# Patient Record
Sex: MALE | Race: BLACK OR AFRICAN AMERICAN | NOT HISPANIC OR LATINO | Employment: OTHER | URBAN - METROPOLITAN AREA
[De-identification: names, ages, dates, MRNs, and addresses within clinical notes are randomized per-mention and may not be internally consistent; named-entity substitution may affect disease eponyms.]

---

## 2021-07-08 ENCOUNTER — HOSPITAL ENCOUNTER (EMERGENCY)
Facility: HOSPITAL | Age: 61
Discharge: HOME | End: 2021-07-08
Attending: EMERGENCY MEDICINE | Admitting: EMERGENCY MEDICINE

## 2021-07-08 ENCOUNTER — APPOINTMENT (OUTPATIENT)
Dept: CT IMAGING | Facility: HOSPITAL | Age: 61
End: 2021-07-08

## 2021-07-08 ENCOUNTER — APPOINTMENT (OUTPATIENT)
Dept: GENERAL RADIOLOGY | Facility: HOSPITAL | Age: 61
End: 2021-07-08

## 2021-07-08 VITALS
SYSTOLIC BLOOD PRESSURE: 127 MMHG | RESPIRATION RATE: 16 BRPM | OXYGEN SATURATION: 99 % | TEMPERATURE: 98.6 F | HEART RATE: 72 BPM | DIASTOLIC BLOOD PRESSURE: 85 MMHG

## 2021-07-08 DIAGNOSIS — R55 SYNCOPE AND COLLAPSE: Primary | ICD-10-CM

## 2021-07-08 DIAGNOSIS — S50.312A ABRASION OF LEFT ELBOW, INITIAL ENCOUNTER: ICD-10-CM

## 2021-07-08 DIAGNOSIS — S01.81XA FACIAL LACERATION, INITIAL ENCOUNTER: ICD-10-CM

## 2021-07-08 LAB
ALBUMIN SERPL-MCNC: 4.2 G/DL (ref 3.5–5.2)
ALBUMIN/GLOB SERPL: 1.6 G/DL
ALP SERPL-CCNC: 51 U/L (ref 39–117)
ALT SERPL W P-5'-P-CCNC: 17 U/L (ref 1–41)
AMPHET+METHAMPHET UR QL: NEGATIVE
ANION GAP SERPL CALCULATED.3IONS-SCNC: 10 MMOL/L (ref 5–15)
AST SERPL-CCNC: 19 U/L (ref 1–40)
BACTERIA UR QL AUTO: ABNORMAL /HPF
BARBITURATES UR QL SCN: NEGATIVE
BASOPHILS # BLD AUTO: 0.05 10*3/MM3 (ref 0–0.2)
BASOPHILS NFR BLD AUTO: 0.4 % (ref 0–1.5)
BENZODIAZ UR QL SCN: NEGATIVE
BILIRUB SERPL-MCNC: 0.5 MG/DL (ref 0–1.2)
BILIRUB UR QL STRIP: NEGATIVE
BUN SERPL-MCNC: 14 MG/DL (ref 8–23)
BUN/CREAT SERPL: 16.9 (ref 7–25)
CALCIUM SPEC-SCNC: 9.5 MG/DL (ref 8.6–10.5)
CANNABINOIDS SERPL QL: NEGATIVE
CHLORIDE SERPL-SCNC: 106 MMOL/L (ref 98–107)
CLARITY UR: CLEAR
CO2 SERPL-SCNC: 25 MMOL/L (ref 22–29)
COCAINE UR QL: NEGATIVE
COLOR UR: YELLOW
CREAT SERPL-MCNC: 0.83 MG/DL (ref 0.76–1.27)
DEPRECATED RDW RBC AUTO: 43.2 FL (ref 37–54)
EOSINOPHIL # BLD AUTO: 0.02 10*3/MM3 (ref 0–0.4)
EOSINOPHIL NFR BLD AUTO: 0.2 % (ref 0.3–6.2)
ERYTHROCYTE [DISTWIDTH] IN BLOOD BY AUTOMATED COUNT: 12.6 % (ref 12.3–15.4)
ETHANOL BLD-MCNC: <10 MG/DL (ref 0–10)
ETHANOL UR QL: <0.01 %
GFR SERPL CREATININE-BSD FRML MDRD: 113 ML/MIN/1.73
GLOBULIN UR ELPH-MCNC: 2.7 GM/DL
GLUCOSE SERPL-MCNC: 114 MG/DL (ref 65–99)
GLUCOSE UR STRIP-MCNC: NEGATIVE MG/DL
HCT VFR BLD AUTO: 43 % (ref 37.5–51)
HGB BLD-MCNC: 14.7 G/DL (ref 13–17.7)
HGB UR QL STRIP.AUTO: NEGATIVE
HYALINE CASTS UR QL AUTO: ABNORMAL /LPF
IMM GRANULOCYTES # BLD AUTO: 0.1 10*3/MM3 (ref 0–0.05)
IMM GRANULOCYTES NFR BLD AUTO: 0.9 % (ref 0–0.5)
KETONES UR QL STRIP: NEGATIVE
LEUKOCYTE ESTERASE UR QL STRIP.AUTO: ABNORMAL
LYMPHOCYTES # BLD AUTO: 1.5 10*3/MM3 (ref 0.7–3.1)
LYMPHOCYTES NFR BLD AUTO: 12.9 % (ref 19.6–45.3)
MAGNESIUM SERPL-MCNC: 2.1 MG/DL (ref 1.6–2.4)
MCH RBC QN AUTO: 32.1 PG (ref 26.6–33)
MCHC RBC AUTO-ENTMCNC: 34.2 G/DL (ref 31.5–35.7)
MCV RBC AUTO: 93.9 FL (ref 79–97)
METHADONE UR QL SCN: NEGATIVE
MONOCYTES # BLD AUTO: 0.67 10*3/MM3 (ref 0.1–0.9)
MONOCYTES NFR BLD AUTO: 5.8 % (ref 5–12)
NEUTROPHILS NFR BLD AUTO: 79.8 % (ref 42.7–76)
NEUTROPHILS NFR BLD AUTO: 9.28 10*3/MM3 (ref 1.7–7)
NITRITE UR QL STRIP: NEGATIVE
NRBC BLD AUTO-RTO: 0 /100 WBC (ref 0–0.2)
OPIATES UR QL: NEGATIVE
OXYCODONE UR QL SCN: NEGATIVE
PH UR STRIP.AUTO: 6 [PH] (ref 5–8)
PLATELET # BLD AUTO: 228 10*3/MM3 (ref 140–450)
PMV BLD AUTO: 10 FL (ref 6–12)
POTASSIUM SERPL-SCNC: 4.7 MMOL/L (ref 3.5–5.2)
PROT SERPL-MCNC: 6.9 G/DL (ref 6–8.5)
PROT UR QL STRIP: ABNORMAL
QT INTERVAL: 355 MS
RBC # BLD AUTO: 4.58 10*6/MM3 (ref 4.14–5.8)
RBC # UR: ABNORMAL /HPF
REF LAB TEST METHOD: ABNORMAL
SODIUM SERPL-SCNC: 141 MMOL/L (ref 136–145)
SP GR UR STRIP: 1.02 (ref 1–1.03)
SQUAMOUS #/AREA URNS HPF: ABNORMAL /HPF
TRANS CELLS #/AREA URNS HPF: ABNORMAL /HPF
TROPONIN T SERPL-MCNC: <0.01 NG/ML (ref 0–0.03)
UROBILINOGEN UR QL STRIP: ABNORMAL
WBC # BLD AUTO: 11.62 10*3/MM3 (ref 3.4–10.8)
WBC UR QL AUTO: ABNORMAL /HPF

## 2021-07-08 PROCEDURE — 72125 CT NECK SPINE W/O DYE: CPT

## 2021-07-08 PROCEDURE — 93005 ELECTROCARDIOGRAM TRACING: CPT

## 2021-07-08 PROCEDURE — 99284 EMERGENCY DEPT VISIT MOD MDM: CPT

## 2021-07-08 PROCEDURE — 80307 DRUG TEST PRSMV CHEM ANLYZR: CPT | Performed by: EMERGENCY MEDICINE

## 2021-07-08 PROCEDURE — 93010 ELECTROCARDIOGRAM REPORT: CPT | Performed by: INTERNAL MEDICINE

## 2021-07-08 PROCEDURE — 84484 ASSAY OF TROPONIN QUANT: CPT | Performed by: EMERGENCY MEDICINE

## 2021-07-08 PROCEDURE — 73070 X-RAY EXAM OF ELBOW: CPT

## 2021-07-08 PROCEDURE — 70450 CT HEAD/BRAIN W/O DYE: CPT

## 2021-07-08 PROCEDURE — 82077 ASSAY SPEC XCP UR&BREATH IA: CPT | Performed by: EMERGENCY MEDICINE

## 2021-07-08 PROCEDURE — 81001 URINALYSIS AUTO W/SCOPE: CPT | Performed by: EMERGENCY MEDICINE

## 2021-07-08 PROCEDURE — 71046 X-RAY EXAM CHEST 2 VIEWS: CPT

## 2021-07-08 PROCEDURE — 80053 COMPREHEN METABOLIC PANEL: CPT | Performed by: EMERGENCY MEDICINE

## 2021-07-08 PROCEDURE — 83735 ASSAY OF MAGNESIUM: CPT | Performed by: EMERGENCY MEDICINE

## 2021-07-08 PROCEDURE — 85025 COMPLETE CBC W/AUTO DIFF WBC: CPT | Performed by: EMERGENCY MEDICINE

## 2021-07-08 RX ORDER — LIDOCAINE HYDROCHLORIDE AND EPINEPHRINE 10; 10 MG/ML; UG/ML
10 INJECTION, SOLUTION INFILTRATION; PERINEURAL ONCE
Status: COMPLETED | OUTPATIENT
Start: 2021-07-08 | End: 2021-07-08

## 2021-07-08 RX ORDER — SODIUM CHLORIDE 0.9 % (FLUSH) 0.9 %
10 SYRINGE (ML) INJECTION AS NEEDED
Status: DISCONTINUED | OUTPATIENT
Start: 2021-07-08 | End: 2021-07-08 | Stop reason: HOSPADM

## 2021-07-08 RX ADMIN — LIDOCAINE HYDROCHLORIDE,EPINEPHRINE BITARTRATE 10 ML: 10; .01 INJECTION, SOLUTION INFILTRATION; PERINEURAL at 18:03

## 2021-07-08 NOTE — ED PROVIDER NOTES
EMERGENCY DEPARTMENT ENCOUNTER    Room Number:  35/35  Date of encounter:  7/8/2021  PCP: Provider, No Known  Historian: Patient and EMS      HPI:  Chief Complaint: Passed out    A complete HPI/ROS/PMH/PSH/SH/FH are unobtainable due to: Uncooperative    Context: Oziel Acosta is a 61 y.o. male who presents to the ED c/o passing out this afternoon.  The patient states he was just discharged from the [facility] earlier today and was headed to a shelter but the shelter was not open so he decided to use a large amount of spice and then passed out hitting his head.  EMS was called and they brought [facility].  The patient complains of pain all over but no specific complaints.      PAST MEDICAL HISTORY  Active Ambulatory Problems     Diagnosis Date Noted   • No Active Ambulatory Problems     Resolved Ambulatory Problems     Diagnosis Date Noted   • No Resolved Ambulatory Problems     No Additional Past Medical History         PAST SURGICAL HISTORY  No past surgical history on file.      FAMILY HISTORY  No family history on file.      SOCIAL HISTORY  Social History     Socioeconomic History   • Marital status: Single     Spouse name: Not on file   • Number of children: Not on file   • Years of education: Not on file   • Highest education level: Not on file         ALLERGIES  Keflex [cephalexin]        REVIEW OF SYSTEMS  Review of Systems     The patient does complain of headache but denies neck pain, sore throat, chest pain, fevers, chills, cough, known Covid exposure, abdominal pain, vomiting, diarrhea or focal neuro deficit    All systems reviewed and negative except for those discussed in HPI.     PHYSICAL EXAM    I have reviewed the triage vital signs and nursing notes.    ED Triage Vitals [07/08/21 1623]   Temp Heart Rate Resp BP SpO2   98.6 °F (37 °C) 100 16 100/70 100 %      Temp src Heart Rate Source Patient Position BP Location FiO2 (%)   Temporal Monitor -- -- --       GENERAL:  61-year-old disheveled well developed, well nourished in no acute distress  HENT: Laceration lateral to right eye, no C-spine tenderness, neck supple, trachea midline  EYES: no scleral icterus, PERRL, normal conjunctiva  CV: regular rhythm, regular rate, no murmur  RESPIRATORY: unlabored effort, CTAB  ABDOMEN: soft, non-tender, non-distended, bowel sounds present  MUSCULOSKELETAL: no gross deformity, no pedal edema, no calf tenderness: The patient has a skin tear to his left elbow  NEURO: alert,  sensory and motor function of extremities intact, speech clear, mental status normal  SKIN: warm, dry, no rash  PSYCH: Flat affect      PPE  Pt does not present with symptoms for COVID19; however, I was wearing a mask and goggles throughout all patient interaction.    Vital signs and nursing notes reviewed.      LAB RESULTS  Recent Results (from the past 24 hour(s))   ECG 12 Lead    Collection Time: 07/08/21  4:37 PM   Result Value Ref Range    QT Interval 355 ms   Comprehensive Metabolic Panel    Collection Time: 07/08/21  5:59 PM    Specimen: Blood   Result Value Ref Range    Glucose 114 (H) 65 - 99 mg/dL    BUN 14 8 - 23 mg/dL    Creatinine 0.83 0.76 - 1.27 mg/dL    Sodium 141 136 - 145 mmol/L    Potassium 4.7 3.5 - 5.2 mmol/L    Chloride 106 98 - 107 mmol/L    CO2 25.0 22.0 - 29.0 mmol/L    Calcium 9.5 8.6 - 10.5 mg/dL    Total Protein 6.9 6.0 - 8.5 g/dL    Albumin 4.20 3.50 - 5.20 g/dL    ALT (SGPT) 17 1 - 41 U/L    AST (SGOT) 19 1 - 40 U/L    Alkaline Phosphatase 51 39 - 117 U/L    Total Bilirubin 0.5 0.0 - 1.2 mg/dL    eGFR  African Amer 113 >60 mL/min/1.73    Globulin 2.7 gm/dL    A/G Ratio 1.6 g/dL    BUN/Creatinine Ratio 16.9 7.0 - 25.0    Anion Gap 10.0 5.0 - 15.0 mmol/L   Troponin    Collection Time: 07/08/21  5:59 PM    Specimen: Blood   Result Value Ref Range    Troponin T <0.010 0.000 - 0.030 ng/mL   Ethanol    Collection Time: 07/08/21  5:59 PM    Specimen: Blood   Result Value Ref Range    Ethanol <10 0 -  10 mg/dL    Ethanol % <0.010 %   Magnesium    Collection Time: 07/08/21  5:59 PM    Specimen: Blood   Result Value Ref Range    Magnesium 2.1 1.6 - 2.4 mg/dL   CBC Auto Differential    Collection Time: 07/08/21  5:59 PM    Specimen: Blood   Result Value Ref Range    WBC 11.62 (H) 3.40 - 10.80 10*3/mm3    RBC 4.58 4.14 - 5.80 10*6/mm3    Hemoglobin 14.7 13.0 - 17.7 g/dL    Hematocrit 43.0 37.5 - 51.0 %    MCV 93.9 79.0 - 97.0 fL    MCH 32.1 26.6 - 33.0 pg    MCHC 34.2 31.5 - 35.7 g/dL    RDW 12.6 12.3 - 15.4 %    RDW-SD 43.2 37.0 - 54.0 fl    MPV 10.0 6.0 - 12.0 fL    Platelets 228 140 - 450 10*3/mm3    Neutrophil % 79.8 (H) 42.7 - 76.0 %    Lymphocyte % 12.9 (L) 19.6 - 45.3 %    Monocyte % 5.8 5.0 - 12.0 %    Eosinophil % 0.2 (L) 0.3 - 6.2 %    Basophil % 0.4 0.0 - 1.5 %    Immature Grans % 0.9 (H) 0.0 - 0.5 %    Neutrophils, Absolute 9.28 (H) 1.70 - 7.00 10*3/mm3    Lymphocytes, Absolute 1.50 0.70 - 3.10 10*3/mm3    Monocytes, Absolute 0.67 0.10 - 0.90 10*3/mm3    Eosinophils, Absolute 0.02 0.00 - 0.40 10*3/mm3    Basophils, Absolute 0.05 0.00 - 0.20 10*3/mm3    Immature Grans, Absolute 0.10 (H) 0.00 - 0.05 10*3/mm3    nRBC 0.0 0.0 - 0.2 /100 WBC   Urinalysis With Microscopic If Indicated (No Culture) - Urine, Clean Catch    Collection Time: 07/08/21  7:25 PM    Specimen: Urine, Clean Catch   Result Value Ref Range    Color, UA Yellow Yellow, Straw    Appearance, UA Clear Clear    pH, UA 6.0 5.0 - 8.0    Specific Gravity, UA 1.018 1.005 - 1.030    Glucose, UA Negative Negative    Ketones, UA Negative Negative    Bilirubin, UA Negative Negative    Blood, UA Negative Negative    Protein, UA 30 mg/dL (1+) (A) Negative    Leuk Esterase, UA Moderate (2+) (A) Negative    Nitrite, UA Negative Negative    Urobilinogen, UA 1.0 E.U./dL 0.2 - 1.0 E.U./dL   Urine Drug Screen - Urine, Clean Catch    Collection Time: 07/08/21  7:25 PM    Specimen: Urine, Clean Catch   Result Value Ref Range    Amphet/Methamphet, Screen Negative  Negative    Barbiturates Screen, Urine Negative Negative    Benzodiazepine Screen, Urine Negative Negative    Cocaine Screen, Urine Negative Negative    Opiate Screen Negative Negative    THC, Screen, Urine Negative Negative    Methadone Screen, Urine Negative Negative    Oxycodone Screen, Urine Negative Negative   Urinalysis, Microscopic Only - Urine, Clean Catch    Collection Time: 07/08/21  7:25 PM    Specimen: Urine, Clean Catch   Result Value Ref Range    RBC, UA 0-2 None Seen, 0-2 /HPF    WBC, UA 31-50 (A) None Seen, 0-2 /HPF    Bacteria, UA None Seen None Seen /HPF    Squamous Epithelial Cells, UA 3-6 (A) None Seen, 0-2 /HPF    Transitional Epithelial Cells, UA 3-6 (A) 0 - 2 /HPF    Hyaline Casts, UA 13-20 None Seen /LPF    Methodology Manual Light Microscopy        Ordered the above labs and independently reviewed the results.        RADIOLOGY  XR Chest 2 View, XR ELBOW 2 VIEW LEFT    Result Date: 7/8/2021  XR CHEST 2 VW-, XR ELBOW 2 VW LEFT-  Clinical: Fell, shortness of breath  Left elbow findings: No joint effusion, fracture or dislocation. Surrounding soft tissues have a satisfactory appearance.  CONCLUSION: No acute osseous articular abnormality of the left elbow  Chest findings: No pleural effusion or pulmonary edema. Clothing artifact superimposes the chest most pronounced at the neck base. Cardiac size within normal limits. No lung consolidation or suspicious lesion. Few small calcified right hilar lymph nodes noted. No gross rib fracture identified.  CONCLUSION: No active cardiovascular or pulmonary process is demonstrated.  This report was finalized on 7/8/2021 6:02 PM by Dr. Kun Gooden M.D.      CT Head Without Contrast, CT Cervical Spine Without Contrast    Result Date: 7/8/2021  EMERGENCY NONCONTRAST HEAD CT AND NONCONTRAST CERVICAL SPINE CT 07/08/2021  CLINICAL HISTORY: Patient passed out this afternoon and fell, headache and neck pain.  HEAD CT TECHNIQUE: Spiral CT images were obtained  from the base of the skull to the vertex without intravenous contrast. Images were reformatted and submitted in 3 mm thick axial CT sections with brain algorithm and 2 mm thick axial CT sections with high-resolution bone algorithm and 2 mm thick sagittal and coronal reconstructions were performed and submitted in brain algorithm.  COMPARISON: There are no prior head CTs from [facility] for comparison.  FINDINGS: The brain parenchyma is normal in attenuation. The ventricles are normal in size. I see no focal mass effect. There is no midline shift. No extra axial fluid collections are identified. There is no evidence of acute intracranial hemorrhage. No acute skull fracture is identified. The calvarium and skull base are normal in appearance. The paranasal sinuses and the mastoid air cells and middle ear cavities are clear. Scattered calcified plaques are present in the cavernous internal carotid arteries. There is a small scalp hematoma and scalp laceration in the right lateral periorbital scalp. There is a small scalp hematoma and there is adjacent scalp laceration in the lateral right periorbital scalp. Otherwise this is a negative head CT with no acute skull fracture or intracranial hemorrhage identified.  CERVICAL SPINE CT TECHNIQUE: Spiral CT images were obtained from the skull base down to the T1 thoracic level. Images were reformatted and submitted in 2 mm thick axial and sagittal CT sections with soft tissue algorithm, 1 mm thick axial sagittal and coronal CT sections with high-resolution bone algorithm.  COMPARISON: There are no prior cervical spine imaging studies from [facility] for comparison.  FINDINGS: The atlantooccipital articulation is normal in appearance.  At C1-2 there are arthritic changes at the atlantodental interval with marginal spurring off the anterior ring of C1 and the odontoid process. Otherwise C1-2 level is normal in appearance.  At C2-3 there is a thick  shell of ossification of posterior longitudinal ligament extending from the horizontal level of the posterior base of the odontoid down to the posterior inferior body of C3 that extends 3.5 cm in craniocaudal dimension and measures 11 x 2.5 mm in medial lateral and anterior posterior dimension and diffusely indents the anterior midline of thecal sac, mild-to-moderately diffusely narrows the canal from the base of the odontoid down to the inferior body of C3 cervical level.  At C3-4, there is diffuse posterior bulging, there is somewhat protruding disc material that abuts the ventral surface cord mild-to-moderately narrowing the canal. There is mild left uncovertebral joint hypertrophy, mild left and no right foraminal narrowing.  At C4 along the course of the C4 vertebrae there is a thin shell of ossification of posterior longitudinal ligament along the posterior midline of right posterior body of C4 that mildly narrows the central right side of the canal along the course of C4 vertebrae.  At C4-5 there is posterior protruding disc material that abuts and deforms the ventral surface cord at least mild-to-moderately narrowing the canal. There is some uncovertebral joint hypertrophy and minimal facet overgrowth and there is mild right but no left foraminal narrowing.  At C5-6 posterior central disc bulge or small disc protrusion mildly narrows the central left side of the canal. There is some minimal facet overgrowth and uncovertebral joint hypertrophy, mild bilateral foraminal narrowing.  At C6-7 there is some minimal thickening and ossification posterior longitudinal ligament along the back of the C6 vertebrae that minimally narrows the canal, at the C6-7 interspace level there is a small posterior central disc osteophyte complex mildly narrowing the central portion of the canal. There is no foraminal narrowing.  At C7-T1 posterior disc margin and facets are normal with no canal or foraminal narrowing.  No acute  fracture seen in the cervical spine.      No acute fracture seen in the cervical spine. There is cervical spondylosis as described. There are areas of ossification of posterior longitudinal ligament along the back of the C2 and C3 vertebrae mild-to-moderately narrowing the canal and along the back of the C4 vertebrae and C6 vertebrae mildly narrowing the canal.  Radiation dose reduction techniques were utilized, including automated exposure control and exposure modulation based on body size.         I ordered the above noted radiological studies. Independently reviewed by me and discussed with radiologist.  See dictation above for official radiology interpretation.      PROCEDURES    Laceration Repair    Date/Time: 7/8/2021 10:46 PM  Performed by: Chriss Thomas MD  Authorized by: Chriss Thomas MD     Consent:     Consent obtained:  Verbal    Consent given by:  Patient    Alternatives discussed:  No treatment  Anesthesia (see MAR for exact dosages):     Anesthesia method:  Local infiltration    Local anesthetic:  Lidocaine 1% WITH epi  Laceration details:     Location:  Face    Face location:  R eyebrow    Length (cm):  2    Depth (mm):  2  Repair type:     Repair type:  Simple  Pre-procedure details:     Preparation:  Patient was prepped and draped in usual sterile fashion  Exploration:     Hemostasis achieved with:  Direct pressure and epinephrine    Wound exploration: wound explored through full range of motion      Wound extent: no foreign bodies/material noted and no muscle damage noted      Contaminated: no    Treatment:     Area cleansed with:  Betadine    Amount of cleaning:  Standard    Irrigation solution:  Sterile saline    Irrigation method:  Syringe    Visualized foreign bodies/material removed: no    Skin repair:     Repair method:  Sutures    Suture size:  4-0    Suture material:  Nylon    Suture technique:  Simple interrupted    Number of sutures:  3  Approximation:     Approximation:   Close  Post-procedure details:     Dressing:  Antibiotic ointment and adhesive bandage    Patient tolerance of procedure:  Tolerated well, no immediate complications            MEDICATIONS GIVEN IN ER    Medications   lidocaine 1% - EPINEPHrine [id number] (XYLOCAINE W/EPI) 1 %-1:[id number] injection 10 mL (10 mL Injection Given by Other 7/8/21 1803)         PROGRESS, DATA ANALYSIS, CONSULTS, AND MEDICAL DECISION MAKING    All labs have been independently reviewed by me.  All radiology studies have been reviewed by me and discussed with radiologist dictating report.   EKG's independently reviewed by me.  Discussion below represents my analysis of pertinent findings related to patient's condition, differential diagnosis, treatment plan and final disposition.      ED Course as of Jul 08 2248   Thu Jul 08, 2021   1657 EKG    EKG time: 1637  Rhythm/Rate: Normal sinus rhythm at 71  No Acute Ischemia  Non-Specific ST-T changes  Peaked T waves V3 through V6 which could be consistent with hyperkalemia, hyperacute T waves or possibly the patient's baseline  No old EKG for comparison: I have [facility]: Currently the patient is denying chest pain.    Interpreted Contemporaneously by me.  Independently viewed by me        [GP]   1658 The patient presents with a syncopal episode after using spice after just [facility].  The patient cannot/will not tell me why he was admitted there.  The patient is not cooperative with my questioning.  I will obtain a head CT, CT C-spine, x-rays, labs, EKG, EtOH and UDS were placed in the patient on the monitor and give him IV fluids.  I have asked for the patient's discharge summary and [facility] the [facility].    [GP]   1850 I discussed the patient's head CT and CT C-spine with Dr. Mcnulty from radiology he states that his head and C-spine are negative acute.    [GP]   1851 I was able to obtain the [facility]'s [facility] today  which states he was there for depression, psychosis, cocaine abuse, alcohol abuse and marijuana abuse.  I have reviewed his EKG from March 2021 which shows the same peaked T waves in his anterior leads which was read as early repolarization and unchanged from a prior EKG in May 2017    [GP]   1949 The patient is currently alert and orient x3.  He is stating he is go to [facility].  His imaging, EKG and labs have been unremarkable.  I need to suture the patient's laceration and I will consult CCP about placement.    [GP]   2017 I closed the patient's right facial laceration with three 4-0 nylon.  We will dress the wound.  CCP will get the patient a cab voucher to take him to [facility].    [GP]      ED Course User Index  [GP] Chriss Thomas MD           The differential diagnosis includes but is not limited to syncope, electrolyte abnormality, closed head injury, skull fracture, subarachnoid hemorrhage, electrolyte abnormality or polysubstance abuse        AS OF 22:48 EDT VITALS:    BP - 127/85  HR - 72  TEMP - 98.6 °F (37 °C) (Temporal)  02 SATS - 99%        DIAGNOSIS  Final diagnoses:   Syncope and collapse   Facial laceration, initial encounter   Abrasion of left elbow, initial encounter         DISPOSITION  DISCHARGE    Patient discharged in stable condition.    Reviewed implications of results, diagnosis, meds, responsibility to follow up, warning signs and symptoms of possible worsening, potential complications and reasons to return to ER, including altered mental status, uncontrolled bleeding or focal neuro deficit.    Patient/Family voiced understanding of above instructions.    Discussed plan for discharge, as there is no emergent indication for admission.  Pt/family is agreeable and understands need for follow up and repeat testing.  Pt is aware that discharge does not mean that nothing is wrong but it indicates no emergency is present and they must continue care with  follow-up as given below or physician of their choice.     FOLLOW-UP  [facility] [place] [address]  [facility] [address] [phone]  In 1 week  For recheck, For wound re-check, For suture removal              EMR Dragon/Transcription disclaimer:   Much of this encounter note is an electronic transcription/translation of spoken language to printed text.        Chriss Thomas MD  07/08/21 [id number]

## 2021-07-08 NOTE — ED NOTES
PT insisted IV be removed, states he is ready to leave and asking why they have not taken care of small lac to right lateral eyebrow. Dressing applied to left elbow abrasion. Pt appears agitated and wants to leave. MD notified     Joann Sage, FATOUMATA  07/08/21 1930

## 2021-07-08 NOTE — ED TRIAGE NOTES
Pt was brought in by ems for syncope. Pt was reports using spice and was outside prior to episode. Pt was given fluids enroute. Pt has multiple abrasions/lacerations from prior falls after using spice. Reports laceration to right brow and left elbow from today.     Pt wearing mask on arrival. Staff wearing mask and goggles at time of triage.

## 2021-07-09 NOTE — ED NOTES
Pt handed cab voucher which he placed in his green bag he carried with him out of the room to the lobby.     Joann Sage RN  07/08/21 2033

## 2021-07-09 NOTE — DISCHARGE INSTRUCTIONS
Keep wounds clean and dry.  Follow-up with the [facility] in 1 week for suture removal